# Patient Record
Sex: MALE | Race: WHITE | NOT HISPANIC OR LATINO | URBAN - METROPOLITAN AREA
[De-identification: names, ages, dates, MRNs, and addresses within clinical notes are randomized per-mention and may not be internally consistent; named-entity substitution may affect disease eponyms.]

---

## 2018-01-22 ENCOUNTER — OFFICE VISIT - RIVER FALLS (OUTPATIENT)
Dept: FAMILY MEDICINE | Facility: CLINIC | Age: 28
End: 2018-01-22

## 2020-10-28 ENCOUNTER — OFFICE VISIT - RIVER FALLS (OUTPATIENT)
Dept: FAMILY MEDICINE | Facility: CLINIC | Age: 30
End: 2020-10-28

## 2022-02-12 VITALS
SYSTOLIC BLOOD PRESSURE: 122 MMHG | TEMPERATURE: 97.5 F | BODY MASS INDEX: 23.87 KG/M2 | WEIGHT: 164 LBS | HEART RATE: 81 BPM | OXYGEN SATURATION: 98 % | DIASTOLIC BLOOD PRESSURE: 84 MMHG

## 2022-02-12 VITALS
HEART RATE: 79 BPM | SYSTOLIC BLOOD PRESSURE: 118 MMHG | BODY MASS INDEX: 23.73 KG/M2 | WEIGHT: 163 LBS | DIASTOLIC BLOOD PRESSURE: 74 MMHG | TEMPERATURE: 97.3 F

## 2022-02-16 NOTE — PROGRESS NOTES
Patient:   OPAL RESENDEZ            MRN: [id number]            FIN: [id number]               Age:   27 years     Sex:  Male     :  1990   Associated Diagnoses:   Influenza   Author:   Bruno Brambila PA-C      Report Summary   Diagnosis  Influenza ([id number] J11.1).  Patient InstructionsOrders   Visit Information      Date of Service: 2018 10:25 am  Performing Location: [facility]  Encounter#: [id number]      Primary Care Provider (PCP):  Bruno Brambila PA-C    NPI# [id number]   Visit type:  New symptom.    Accompanied by:  No one.    Source of history:  Self.    Referral source:  Self.    History limitation:  None.       Chief Complaint   2018 10:30 AM CST   Cough, congestion, HA and body aches X 4 days; IBU @ 8:30am        History of Present Illness             The patient presents with cough.  The cough is described as hacking.  The severity of the cough is moderate.  The cough is episodic and fluctuates in intensity.  The cough has lasted for 3 day(s).  The context of the cough: occurred in association with illness.  Associated symptoms consist of fever, nasal congestion, rhinorrhea and sore throat.        Review of Systems   Constitutional:  Fever.    Ear/Nose/Mouth/Throat:  Nasal congestion.    Respiratory:  Cough.             Health Status   Allergies:    Allergic Reactions (All)  Severity Not Documented  Amoxicillin (Hives and urticaria)  Motrin (Bloody noses)  Penicillin (No reactions were documented)   Medications:  (Selected)   Prescriptions  Prescribed  Tamiflu 75 mg oral capsule: 1 cap(s) ( 75 mg ), PO, BID, # 10 cap(s), 0 Refill(s), Type: Maintenance, Pharmacy: [facility] #[id number], 1 cap(s) po bid,x5 day(s)  Documented Medications  Documented  ibuprofen: prn, 0 Refill(s), Type: Maintenance   Problem list:    All Problems  Sprained Ankle / ICD-9-.00 / Confirmed  Tobacco user / ICD-9-.1 / Probable      Histories   Past Medical History:    No active or  resolved past medical history items have been selected or recorded.   Family History:    Asthma  Cousin (M)     Procedure history:    PE tubes.  closed reduction of wrist FX.   Social History:        Alcohol Assessment            Current, 1-2 times per week      Tobacco Assessment            Current, Snuff      Employment and Education Assessment            Employed, Work/School description: Auto body work.        Physical Examination   Vital Signs   1/22/2018 10:30 AM CST Temperature Tympanic 97.5 DegF  LOW    Peripheral Pulse Rate 81 bpm    HR Method Electronic    Systolic Blood Pressure 122 mmHg    Diastolic Blood Pressure 84 mmHg  HI    Mean Arterial Pressure 97 mmHg    BP Site Right arm    BP Method Manual    Oxygen Saturation 98 %      Measurements from flowsheet : Measurements   1/22/2018 10:30 AM CST   Weight Measured - Standard                164.0 lb     General:  Alert and oriented, No acute distress.    Eye:  Pupils are equal, round and reactive to light, Extraocular movements are intact, Normal conjunctiva.    HENT:  Normocephalic, Tympanic membranes are clear, Oral mucosa is moist.         Nose: Both nostrils, Discharge ( Moderate amount, Clear ).    Neck:  Supple, No lymphadenopathy.    Respiratory:  Lungs are clear to auscultation, Respirations are non-labored, Breath sounds are equal.    Cardiovascular:  Normal rate, Regular rhythm, No murmur.       Impression and Plan   Diagnosis     Influenza ([id number] J11.1).     Patient Instructions:       Counseled: Patient, Regarding diagnosis, Regarding treatment, Regarding medications, Regarding activity, Verbalized understanding.    Orders     Orders (Selected)   Prescriptions  Prescribed  Tamiflu 75 mg oral capsule: 1 cap(s) ( 75 mg ), PO, BID, # 10 cap(s), 0 Refill(s), Type: Maintenance, Pharmacy: [facility] #[id number], 1 cap(s) po bid,x5 day(s).     Take medicine as prescribed, side effects discussed.  Tylenol/ibuprofen for fever and discomfort.  Push  fluids.  RTC if not improving in 36-48 hours, prior if concerns as we have discussed.  Presumed influenza.

## 2022-02-16 NOTE — NURSING NOTE
Comprehensive Intake Entered On:  10/28/2020 5:56 PM CDT    Performed On:  10/28/2020 5:52 PM CDT by Paloma Diaz               Summary   Chief Complaint :   Pt c/o left leg and foot numbness. Pt has seen the chiropracter 3 times in the past week.    Weight Measured :   163 lb(Converted to: 163 lb 0 oz, 73.936 kg)    Systolic Blood Pressure :   118 mmHg   Diastolic Blood Pressure :   74 mmHg   Mean Arterial Pressure :   89 mmHg   Peripheral Pulse Rate :   79 bpm   Temperature Tympanic :   97.3 DegF(Converted to: 36.3 DegC)  (LOW)    Paloma Diaz - 10/28/2020 5:52 PM CDT   Health Status   Allergies Verified? :   Yes   Medication History Verified? :   Yes   Immunizations Current :   Unknown   Medical History Verified? :   No   Pre-Visit Planning Status :   Completed   Tobacco Use? :   Former smoker   Paloma Diaz - 10/28/2020 5:52 PM CDT   Consents   Consent for Immunization Exchange :   Consent Granted   Consent for Immunizations to Providers :   Consent Granted   Paloma Diaz - 10/28/2020 5:52 PM CDT   Meds / Allergies   (As Of: 10/28/2020 5:56:09 PM CDT)   Allergies (Active)   amoxicillin  Estimated Onset Date:   Unspecified ; Reactions:   hives, Urticaria ; Created By:   Alize Jordan; Reaction Status:   Active ; Category:   Drug ; Substance:   amoxicillin ; Type:   Allergy ; Updated By:   Alize Jordan; Source:   Paper Chart ; Reviewed Date:   10/28/2020 5:53 PM CDT      Motrin  Estimated Onset Date:   Unspecified ; Reactions:   bloody noses ; Created By:   Eunice Wilcox; Reaction Status:   Active ; Category:   Drug ; Substance:   Motrin ; Updated By:   Eunice Wilcox; Source:   Paper Chart ; Reviewed Date:   10/28/2020 5:53 PM CDT      penicillin  Estimated Onset Date:   Unspecified ; Created By:   Alize Jordan; Reaction Status:   Active ; Category:   Drug ; Substance:   penicillin ; Type:   Allergy ; Updated By:   Alize Jordan; Reviewed Date:   10/28/2020  5:53 PM CDT        Medication List   (As Of: 10/28/2020 5:56:09 PM CDT)   Prescription/Discharge Order    oseltamivir  :   oseltamivir ; Status:   Processing ; Ordered As Mnemonic:   Tamiflu 75 mg oral capsule ; Ordering Provider:   Bruno Brambila PA-C; Action Display:   Complete ; Catalog Code:   oseltamivir ; Order Dt/Tm:   10/28/2020 5:53:53 PM CDT            Home Meds    ibuprofen  :   ibuprofen ; Status:   Documented ; Ordered As Mnemonic:   ibuprofen ; Simple Display Line:   prn ; Catalog Code:   ibuprofen ; Order Dt/Tm:   7/20/2015 2:24:29 PM CDT            ID Risk Screen   Recent Travel History :   No recent travel   Family Member Travel History :   No recent travel   Other Exposure to Infectious Disease :   Unknown   Paloma Diaz - 10/28/2020 5:52 PM CDT

## 2022-02-16 NOTE — PROGRESS NOTES
Chief Complaint    Pt c/o left leg and foot numbness. Pt has seen the chiropracter 3 times in the past week.  History of Present Illness      Chief complaint as above reviewed and confirmed with patient.  Pt presents to the clinic with concerns re: 1 week history of L low back pain with radiation to the L posterior leg and foot.  worse with sitting and bending.  Better with standing.  No B/B dysfunction. no saddle peristhesia.  Increased pain with cough/sneeze.  Pain radiates to the plantar surface of the foot.  Has tingling sensation, no absence of sensation.  Has tried ibuprofen and chiropractor with minimal effects.  No night pain. no weight loss or hx of cancer.  Works at an [employer], very active but has not interfered with work at all though notes the pain all day.  Review of Systems      Review of systems is negative with the exception of those noted in HPI          Physical Exam   Vitals & Measurements    T: 97.3  F (Tympanic)  HR: 79 (Peripheral)  BP: 118/74     WT: 163 lb       Exam of the back reveals no midline tenderness of the T or L spine      Pt able to forward flex: to touch mid tibia but moderate pain L leg      Resuming upright does not increase pain.        Pain with Flexion, extension, lateral flexion and rotation B.       Muscular strength : weakness noted with L plantar flexion, able to rise up on toes but weaker on the L.  remainder of the muscular strength, sensation WNl      Reflex 5/5 for knee, achilles.         SLR negative on the R, positive L       Figure 4 negative B        No CVAT       Abdomen: BS active, soft, nontender to light or deep palpation.  no pulsitile masses       Perpheral pulses intact at femoral and DP pulses   Assessment/Plan       Lumbar back pain with radiculopathy affecting left lower extremity (M54.16)         long taper or prednisone, then ibuprofen. PT referral.  Discussed his weakness with Plantar flexion on the L. If worsening, any red flags sx, or not  ipmroving consider MRI and or referral to spine surgeon.  Red flags discussed.  FU prn .         Ordered:          predniSONE, See Instructions, Instructions: 4 po qd x 3 days, 3 po qd x 3 days, 2 po qd x 3 days, 1 po qd x 3 days, # 30 tab(s), 0 Refill(s), Type: Acute, Pharmacy: [facility] #[id number], 4 po qd x 3 days, 3 po qd x 3 days, 2 po qd x 3 days, 1 po qd x 3 day..., (Ordered)          Physical Therapy (Request), Lumbar back pain with radiculopathy affecting left lower extremity           Patient Information     Name:OPAL RESENDEZ      Address:      [address] [place] [id number]     Sex:Male     YOB: 1990     [phone]     Emergency Contact:MARYANNE PEDRO     MRN:[id number]     FIN:[id number]     Location:[facility]     Date of Service:10/28/2020      Primary Care Physician:       Bruno Brambila PA-C, [phone]      Attending Physician:       Janeth Wyatt PA-C, [phone]  Problem List/Past Medical History    Ongoing     Sprained Ankle       Comments: left     Tobacco user    Historical     No qualifying data  Procedure/Surgical History     closed reduction of wrist FX           PE tubes        Medications    ibuprofen, prn    predniSONE 10 mg oral tablet, See Instructions  Allergies    Motrin (bloody noses)    amoxicillin (Urticaria, hives)    penicillin  Social History    Smoking Status     Former smoker     Alcohol      Current, 1-2 times per week     Employment/School      Employed, Work/School description: Auto body work.     Tobacco      Current, Snuff  Family History    Asthma: Cousin (M).  Immunizations      Vaccine Date Status          Hep B 04/04/2001 Recorded          Hep B 10/25/2000 Recorded          Hep B 09/25/2000 Recorded          OPV 06/23/1994 Recorded          DTP (obsolete) 06/23/1994 Recorded          MMR (measles/mumps/rubella) 06/23/1994 Recorded          OPV 08/16/1990 Recorded          DTP (obsolete)  08/16/1990 Recorded          MMR (measles/mumps/rubella) 08/16/1990 Recorded          DTP (obsolete) 1989 Recorded          OPV 1989 Recorded          DTP (obsolete) 1989 Recorded          OPV 1989 Recorded          DTP (obsolete) 1989 Recorded